# Patient Record
Sex: MALE | Race: BLACK OR AFRICAN AMERICAN | Employment: FULL TIME | URBAN - METROPOLITAN AREA
[De-identification: names, ages, dates, MRNs, and addresses within clinical notes are randomized per-mention and may not be internally consistent; named-entity substitution may affect disease eponyms.]

---

## 2022-06-08 ENCOUNTER — HOSPITAL ENCOUNTER (OUTPATIENT)
Age: 37
Discharge: HOME | End: 2022-06-08
Payer: COMMERCIAL

## 2022-06-08 VITALS
WEIGHT: 182 LBS | HEART RATE: 73 BPM | TEMPERATURE: 99 F | DIASTOLIC BLOOD PRESSURE: 80 MMHG | HEIGHT: 72 IN | OXYGEN SATURATION: 100 % | BODY MASS INDEX: 24.65 KG/M2 | RESPIRATION RATE: 20 BRPM | SYSTOLIC BLOOD PRESSURE: 131 MMHG

## 2022-06-08 DIAGNOSIS — L73.9 FOLLICULITIS: Primary | ICD-10-CM

## 2022-06-08 PROCEDURE — 99203 OFFICE O/P NEW LOW 30 MIN: CPT | Performed by: NURSE PRACTITIONER

## 2022-06-08 NOTE — ED INITIAL ASSESSMENT (HPI)
Per pt noticed a bump to right inner upper thigh this morning. Denies any other complains at this time no pain drainage or itching just irritated.